# Patient Record
Sex: FEMALE | Race: BLACK OR AFRICAN AMERICAN | NOT HISPANIC OR LATINO | Employment: FULL TIME | URBAN - METROPOLITAN AREA
[De-identification: names, ages, dates, MRNs, and addresses within clinical notes are randomized per-mention and may not be internally consistent; named-entity substitution may affect disease eponyms.]

---

## 2024-09-30 ENCOUNTER — OFFICE VISIT (OUTPATIENT)
Dept: FAMILY MEDICINE | Facility: CLINIC | Age: 29
End: 2024-09-30
Payer: COMMERCIAL

## 2024-09-30 VITALS
SYSTOLIC BLOOD PRESSURE: 129 MMHG | HEART RATE: 101 BPM | DIASTOLIC BLOOD PRESSURE: 81 MMHG | OXYGEN SATURATION: 98 % | TEMPERATURE: 98.5 F | RESPIRATION RATE: 18 BRPM

## 2024-09-30 DIAGNOSIS — R21 RASH: ICD-10-CM

## 2024-09-30 DIAGNOSIS — L50.9 URTICARIA: Primary | ICD-10-CM

## 2024-09-30 PROCEDURE — 99203 OFFICE O/P NEW LOW 30 MIN: CPT

## 2024-09-30 NOTE — PROGRESS NOTES
URGENT CARE    ASSESSMENT AND PLAN:      ICD-10-CM    1. Urticaria  L50.9       2. Rash  R21           Rash sounds to be consistent with urticaria and etiology of this was discussed with patient.  Unclear regarding exposure or trigger.  Will have her trial dose of Zyrtec twice daily for the next 5 to 7 days.  Use of calamine lotion and over-the-counter steroid cream discussed with patient.      Follow up with primary care provider with any problems, questions or concerns or if symptoms worsen or fail to improve. Patient verbalized understanding and is agreeable to plan. The patient was discharged ambulatory and in stable condition.        Chief Complaint   Patient presents with    Urgent Care     Pt states she has had hives that started on her neck and has now traveled to her arms for the past 3 days intermittently. Pt states she has taken OTC benadryl with relief.      SUBJECTIVE:  Homa Law is a 29 year old female who presents to the clinic today for a rash.  Onset of rash was 3 day(s) ago.   Rash is sudden onset.   Location of the rash: neck, bilateral arms, and chest  Quality/symptoms of rash: red and raised   Symptoms are moderate and rash seems to be not changing over the course of time.  Previous history of a similar rash? No  Treatment measures tried include:  Benadryl   Recent exposure history: none known    Patient denies fever/chills, difficulty breathing, throat/tongue swelling, new meds, pets, foods, soaps, detergents, lotions, or enviornmental contacts.    History reviewed. No pertinent past medical history.  Current Outpatient Medications   Medication Sig Dispense Refill    metFORMIN (GLUCOPHAGE) 500 MG tablet Take 1,000 mg by mouth 2 times daily (with meals).       No current facility-administered medications for this visit.     Social History     Tobacco Use    Smoking status: Never    Smokeless tobacco: Never   Substance Use Topics    Alcohol use: Not on file     No Known  Allergies    Review of Systems  All systems reviewed and negative except per HPI.    EXAM:   /81   Pulse 101   Temp 98.5  F (36.9  C) (Tympanic)   Resp 18   SpO2 98%     Physical Exam  GENERAL: alert and no distress  EYES: Eyes grossly normal to inspection, PERRL and conjunctivae and sclerae normal  HENT: ear canals and TM's normal, nose and mouth without ulcers or lesions  NECK: no adenopathy, no asymmetry, masses, or scars  RESP: lungs clear to auscultation - no rales, rhonchi or wheezes  CV: regular rate and rhythm, normal S1 S2, no S3 or S4, no murmur, click or rub, no peripheral edema  SKIN: no suspicious lesions or rashes

## 2024-10-29 DIAGNOSIS — N97.0 FEMALE INFERTILITY ASSOCIATED WITH ANOVULATION: Primary | ICD-10-CM

## 2024-10-31 ENCOUNTER — LAB (OUTPATIENT)
Dept: LAB | Facility: CLINIC | Age: 29
End: 2024-10-31
Payer: COMMERCIAL

## 2024-10-31 DIAGNOSIS — N97.0 FEMALE INFERTILITY ASSOCIATED WITH ANOVULATION: ICD-10-CM

## 2024-10-31 PROCEDURE — 36415 COLL VENOUS BLD VENIPUNCTURE: CPT

## 2024-10-31 PROCEDURE — 84144 ASSAY OF PROGESTERONE: CPT

## 2024-11-01 LAB — PROGEST SERPL-MCNC: 21.7 NG/ML

## 2024-11-14 NOTE — PATIENT INSTRUCTIONS
If you have labs or imaging done, the results will automatically release in [facility] without an interpretation.  Your health care professional will review those results and send an interpretation with recommendations as soon as possible, but this may be 1-3 business days.    If you have any questions regarding your visit, please contact your care team.     [employer] [facility]: [phone]  [facility] CLINIC HOURS TELEPHONE NUMBER   Ita Macias, WILFRID Javier-SOLEDAD Balderrama-SOLEDAD Goff-Surgery Scheduler  Caitlin-       Monday- [place]  8:00 am-4:00 pm    Tuesday- [place]  8:00 am-4:00 pm    Wednesday- [place] 8:00 am-4:00 pm    Thursday- [place] 8:00 am-4:00 pm    Friday- [place]  8:00 am-4:00 pm [facility]  [address]  [place] [address]  PH: [phone]  Fax: [phone]    Imaging Scheduling all locations  PH: [phone]     [facility]  [address] [place] [address] [address].  [place], [place] [address] [phone]    [facility]  [address] [place] [address]  PH: [phone]     **Surgeries** Our Surgery Schedulers will contact you to schedule. If you do not receive a call within 3 business days, please call [phone].  Urgent Care locations:  [facility]       Monday-Friday   10 am - 8 pm    Saturday and Sunday   9 am - 5 pm   [phone]   If you need a medication refill, please contact your pharmacy. Please allow 3 business days for your refill to be completed.  As always, Thank you for trusting us with your healthcare needs!

## 2024-11-15 ENCOUNTER — OFFICE VISIT (OUTPATIENT)
Dept: OBGYN | Facility: CLINIC | Age: 29
End: 2024-11-15
Payer: COMMERCIAL

## 2024-11-15 VITALS
WEIGHT: 211 LBS | DIASTOLIC BLOOD PRESSURE: 77 MMHG | SYSTOLIC BLOOD PRESSURE: 116 MMHG | HEART RATE: 92 BPM | OXYGEN SATURATION: 99 %

## 2024-11-15 DIAGNOSIS — E28.2 PCOS (POLYCYSTIC OVARIAN SYNDROME): Primary | ICD-10-CM

## 2024-11-15 LAB
ALBUMIN SERPL BCG-MCNC: 4.3 G/DL (ref 3.5–5.2)
ALP SERPL-CCNC: 53 U/L (ref 40–150)
ALT SERPL W P-5'-P-CCNC: 37 U/L (ref 0–50)
ANION GAP SERPL CALCULATED.3IONS-SCNC: 11 MMOL/L (ref 7–15)
AST SERPL W P-5'-P-CCNC: 33 U/L (ref 0–45)
BILIRUB SERPL-MCNC: 0.6 MG/DL
BUN SERPL-MCNC: 9.7 MG/DL (ref 6–20)
CALCIUM SERPL-MCNC: 9.4 MG/DL (ref 8.8–10.4)
CHLORIDE SERPL-SCNC: 107 MMOL/L (ref 98–107)
CREAT SERPL-MCNC: 0.87 MG/DL (ref 0.51–0.95)
EGFRCR SERPLBLD CKD-EPI 2021: >90 ML/MIN/1.73M2
GLUCOSE SERPL-MCNC: 98 MG/DL (ref 70–99)
HCO3 SERPL-SCNC: 22 MMOL/L (ref 22–29)
POTASSIUM SERPL-SCNC: 4.1 MMOL/L (ref 3.4–5.3)
PROT SERPL-MCNC: 7.2 G/DL (ref 6.4–8.3)
SODIUM SERPL-SCNC: 140 MMOL/L (ref 135–145)

## 2024-11-15 PROCEDURE — 36415 COLL VENOUS BLD VENIPUNCTURE: CPT

## 2024-11-15 PROCEDURE — 80053 COMPREHEN METABOLIC PANEL: CPT

## 2024-11-15 RX ORDER — LETROZOLE 2.5 MG/1
7.5 TABLET, FILM COATED ORAL
COMMUNITY
Start: 2024-10-12

## 2024-12-15 ENCOUNTER — HEALTH MAINTENANCE LETTER (OUTPATIENT)
Age: 29
End: 2024-12-15

## 2025-01-06 SDOH — HEALTH STABILITY: PHYSICAL HEALTH: ON AVERAGE, HOW MANY DAYS PER WEEK DO YOU ENGAGE IN MODERATE TO STRENUOUS EXERCISE (LIKE A BRISK WALK)?: 0 DAYS

## 2025-01-06 SDOH — HEALTH STABILITY: PHYSICAL HEALTH: ON AVERAGE, HOW MANY MINUTES DO YOU ENGAGE IN EXERCISE AT THIS LEVEL?: 0 MIN

## 2025-01-06 ASSESSMENT — SOCIAL DETERMINANTS OF HEALTH (SDOH): HOW OFTEN DO YOU GET TOGETHER WITH FRIENDS OR RELATIVES?: ONCE A WEEK

## 2025-01-08 ENCOUNTER — OFFICE VISIT (OUTPATIENT)
Dept: FAMILY MEDICINE | Facility: CLINIC | Age: 30
End: 2025-01-08
Payer: COMMERCIAL

## 2025-01-08 ENCOUNTER — MYC MEDICAL ADVICE (OUTPATIENT)
Dept: FAMILY MEDICINE | Facility: CLINIC | Age: 30
End: 2025-01-08

## 2025-01-08 VITALS
WEIGHT: 213.8 LBS | BODY MASS INDEX: 30.61 KG/M2 | SYSTOLIC BLOOD PRESSURE: 115 MMHG | TEMPERATURE: 98 F | RESPIRATION RATE: 12 BRPM | DIASTOLIC BLOOD PRESSURE: 74 MMHG | HEIGHT: 70 IN | HEART RATE: 81 BPM | OXYGEN SATURATION: 98 %

## 2025-01-08 DIAGNOSIS — Z00.00 ROUTINE GENERAL MEDICAL EXAMINATION AT A HEALTH CARE FACILITY: Primary | ICD-10-CM

## 2025-01-08 DIAGNOSIS — Z13.1 SCREENING FOR DIABETES MELLITUS: ICD-10-CM

## 2025-01-08 DIAGNOSIS — Z13.220 SCREENING FOR HYPERLIPIDEMIA: ICD-10-CM

## 2025-01-08 LAB
CHOLEST SERPL-MCNC: 174 MG/DL
EST. AVERAGE GLUCOSE BLD GHB EST-MCNC: 128 MG/DL
FASTING STATUS PATIENT QL REPORTED: YES
HBA1C MFR BLD: 6.1 % (ref 0–5.6)
HDLC SERPL-MCNC: 40 MG/DL
LDLC SERPL CALC-MCNC: 117 MG/DL
NONHDLC SERPL-MCNC: 134 MG/DL
TRIGL SERPL-MCNC: 84 MG/DL

## 2025-01-08 PROCEDURE — 83036 HEMOGLOBIN GLYCOSYLATED A1C: CPT | Performed by: INTERNAL MEDICINE

## 2025-01-08 PROCEDURE — 90656 IIV3 VACC NO PRSV 0.5 ML IM: CPT | Performed by: INTERNAL MEDICINE

## 2025-01-08 PROCEDURE — 90471 IMMUNIZATION ADMIN: CPT | Performed by: INTERNAL MEDICINE

## 2025-01-08 PROCEDURE — 90480 ADMN SARSCOV2 VAC 1/ONLY CMP: CPT | Performed by: INTERNAL MEDICINE

## 2025-01-08 PROCEDURE — 99385 PREV VISIT NEW AGE 18-39: CPT | Mod: 25 | Performed by: INTERNAL MEDICINE

## 2025-01-08 PROCEDURE — 80061 LIPID PANEL: CPT | Performed by: INTERNAL MEDICINE

## 2025-01-08 PROCEDURE — 91320 SARSCV2 VAC 30MCG TRS-SUC IM: CPT | Performed by: INTERNAL MEDICINE

## 2025-01-08 PROCEDURE — 36415 COLL VENOUS BLD VENIPUNCTURE: CPT | Performed by: INTERNAL MEDICINE

## 2025-01-08 ASSESSMENT — PAIN SCALES - GENERAL: PAINLEVEL_OUTOF10: NO PAIN (0)

## 2025-01-08 NOTE — PROGRESS NOTES
Prior to immunization administration, verified patients identity using patient s name and date of birth. Please see Immunization Activity for additional information.     Screening Questionnaire for Adult Immunization    Are you sick today?   No   Do you have allergies to medications, food, a vaccine component or latex?   No   Have you ever had a serious reaction after receiving a vaccination?   No   Do you have a long-term health problem with heart, lung, kidney, or metabolic disease (e.g., diabetes), asthma, a blood disorder, no spleen, complement component deficiency, a cochlear implant, or a spinal fluid leak?  Are you on long-term aspirin therapy?   No   Do you have cancer, leukemia, HIV/AIDS, or any other immune system problem?   No   Do you have a parent, brother, or sister with an immune system problem?   No   In the past 3 months, have you taken medications that affect  your immune system, such as prednisone, other steroids, or anticancer drugs; drugs for the treatment of rheumatoid arthritis, Crohn s disease, or psoriasis; or have you had radiation treatments?   No   Have you had a seizure, or a brain or other nervous system problem?   No   During the past year, have you received a transfusion of blood or blood    products, or been given immune (gamma) globulin or antiviral drug?   No   For women: Are you pregnant or is there a chance you could become       pregnant during the next month?   No   Have you received any vaccinations in the past 4 weeks?   No     Immunization questionnaire answers were all negative.      Patient instructed to remain in clinic for 15 minutes afterwards, and to report any adverse reactions.     Screening performed by Naren Bella MA on 1/8/2025 at 1:58 PM.

## 2025-01-08 NOTE — PATIENT INSTRUCTIONS
Patient Education   Preventive Care Advice   This is general advice given by our system to help you stay healthy. However, your care team may have specific advice just for you. Please talk to your care team about your preventive care needs.  Nutrition  Eat 5 or more servings of fruits and vegetables each day.  Try wheat bread, brown rice and whole grain pasta (instead of white bread, rice, and pasta).  Get enough calcium and vitamin D. Check the label on foods and aim for 100% of the RDA (recommended daily allowance).  Lifestyle  Exercise at least 150 minutes each week  (30 minutes a day, 5 days a week).  Do muscle strengthening activities 2 days a week. These help control your weight and prevent disease.  No smoking.  Wear sunscreen to prevent skin cancer.  Have a dental exam and cleaning every 6 months.  Yearly exams  See your health care team every year to talk about:  Any changes in your health.  Any medicines your care team has prescribed.  Preventive care, family planning, and ways to prevent chronic diseases.  Shots (vaccines)   HPV shots (up to age 26), if you've never had them before.  Hepatitis B shots (up to age 59), if you've never had them before.  COVID-19 shot: Get this shot when it's due.  Flu shot: Get a flu shot every year.  Tetanus shot: Get a tetanus shot every 10 years.  Pneumococcal, hepatitis A, and RSV shots: Ask your care team if you need these based on your risk.  Shingles shot (for age 50 and up)  General health tests  Diabetes screening:  Starting at age 35, Get screened for diabetes at least every 3 years.  If you are younger than age 35, ask your care team if you should be screened for diabetes.  Cholesterol test: At age 39, start having a cholesterol test every 5 years, or more often if advised.  Bone density scan (DEXA): At age 50, ask your care team if you should have this scan for osteoporosis (brittle bones).  Hepatitis C: Get tested at least once in your life.  STIs (sexually  transmitted infections)  Before age 24: Ask your care team if you should be screened for STIs.  After age 24: Get screened for STIs if you're at risk. You are at risk for STIs (including HIV) if:  You are sexually active with more than one person.  You don't use condoms every time.  You or a partner was diagnosed with a sexually transmitted infection.  If you are at risk for HIV, ask about PrEP medicine to prevent HIV.  Get tested for HIV at least once in your life, whether you are at risk for HIV or not.  Cancer screening tests  Cervical cancer screening: If you have a cervix, begin getting regular cervical cancer screening tests starting at age 21.  Breast cancer scan (mammogram): If you've ever had breasts, begin having regular mammograms starting at age 40. This is a scan to check for breast cancer.  Colon cancer screening: It is important to start screening for colon cancer at age 45.  Have a colonoscopy test every 10 years (or more often if you're at risk) Or, ask your provider about stool tests like a FIT test every year or Cologuard test every 3 years.  To learn more about your testing options, visit:   .  For help making a decision, visit:   https://bit.ly/xq62242.  Prostate cancer screening test: If you have a prostate, ask your care team if a prostate cancer screening test (PSA) at age 55 is right for you.  Lung cancer screening: If you are a current or former smoker ages 50 to 80, ask your care team if ongoing lung cancer screenings are right for you.  For informational purposes only. Not to replace the advice of your health care provider. Copyright   2023 [facility] [employer]. All rights reserved. Clinically reviewed by the [facility] Transitions Program. [employer] [id number] - REV 01/24.     Patient Education   Preventive Care Advice   This is general advice given by our system to help you stay healthy. However, your care team may have specific advice just for you. Please talk to your care team  about your preventive care needs.  Nutrition  Eat 5 or more servings of fruits and vegetables each day.  Try wheat bread, brown rice and whole grain pasta (instead of white bread, rice, and pasta).  Get enough calcium and vitamin D. Check the label on foods and aim for 100% of the RDA (recommended daily allowance).  Lifestyle  Exercise at least 150 minutes each week  (30 minutes a day, 5 days a week).  Do muscle strengthening activities 2 days a week. These help control your weight and prevent disease.  No smoking.  Wear sunscreen to prevent skin cancer.  Have a dental exam and cleaning every 6 months.  Yearly exams  See your health care team every year to talk about:  Any changes in your health.  Any medicines your care team has prescribed.  Preventive care, family planning, and ways to prevent chronic diseases.  Shots (vaccines)   HPV shots (up to age 26), if you've never had them before.  Hepatitis B shots (up to age 59), if you've never had them before.  COVID-19 shot: Get this shot when it's due.  Flu shot: Get a flu shot every year.  Tetanus shot: Get a tetanus shot every 10 years.  Pneumococcal, hepatitis A, and RSV shots: Ask your care team if you need these based on your risk.  Shingles shot (for age 50 and up)  General health tests  Diabetes screening:  Starting at age 35, Get screened for diabetes at least every 3 years.  If you are younger than age 35, ask your care team if you should be screened for diabetes.  Cholesterol test: At age 39, start having a cholesterol test every 5 years, or more often if advised.  Bone density scan (DEXA): At age 50, ask your care team if you should have this scan for osteoporosis (brittle bones).  Hepatitis C: Get tested at least once in your life.  STIs (sexually transmitted infections)  Before age 24: Ask your care team if you should be screened for STIs.  After age 24: Get screened for STIs if you're at risk. You are at risk for STIs (including HIV) if:  You are  sexually active with more than one person.  You don't use condoms every time.  You or a partner was diagnosed with a sexually transmitted infection.  If you are at risk for HIV, ask about PrEP medicine to prevent HIV.  Get tested for HIV at least once in your life, whether you are at risk for HIV or not.  Cancer screening tests  Cervical cancer screening: If you have a cervix, begin getting regular cervical cancer screening tests starting at age 21.  Breast cancer scan (mammogram): If you've ever had breasts, begin having regular mammograms starting at age 40. This is a scan to check for breast cancer.  Colon cancer screening: It is important to start screening for colon cancer at age 45.  Have a colonoscopy test every 10 years (or more often if you're at risk) Or, ask your provider about stool tests like a FIT test every year or Cologuard test every 3 years.  To learn more about your testing options, visit:   .  For help making a decision, visit:   https://bit.ly/rk18428.  Prostate cancer screening test: If you have a prostate, ask your care team if a prostate cancer screening test (PSA) at age 55 is right for you.  Lung cancer screening: If you are a current or former smoker ages 50 to 80, ask your care team if ongoing lung cancer screenings are right for you.  For informational purposes only. Not to replace the advice of your health care provider. Copyright   2023 [employer]. All rights reserved. Clinically reviewed by the [facility] Transitions Program. [employer] [id number] - REV 01/24.

## 2025-01-08 NOTE — PROGRESS NOTES
"Preventive Care Visit  [facility]  Eric Daley MD, Internal Medicine  Jan 8, 2025      Assessment & Plan     Routine general medical examination at a health care facility  Discussed about diet and exercise  She has grade 1 obesity  We discussed about Wegovy today  She is keen to start this  She follows up with fertility specialist and [facility]  She is already on metformin for her PCOS along with Femara and Ovidrel injections  She is up-to-date with Pap smear  No family history of any colon cancer or ovarian cancer    Screening for diabetes mellitus  She has history of prediabetes in the past recheck A1c today  - Hemoglobin A1c; Future  - Hemoglobin A1c    Screening for hyperlipidemia    - Lipid panel reflex to direct LDL Fasting; Future  - Lipid panel reflex to direct LDL Fasting    Patient has been advised of split billing requirements and indicates understanding: No        BMI  Estimated body mass index is 31.12 kg/m  as calculated from the following:    Height as of this encounter: 1.765 m (5' 9.5\").    Weight as of this encounter: 97 kg (213 lb 12.8 oz).   Weight management plan: Discussed healthy diet and exercise guidelines    Counseling  Appropriate preventive services were addressed with this patient via screening, questionnaire, or discussion as appropriate for fall prevention, nutrition, physical activity, Tobacco-use cessation, social engagement, weight loss and cognition.  Checklist reviewing preventive services available has been given to the patient.  Reviewed patient's diet, addressing concerns and/or questions.           Gus Luther is a 29 year old, presenting for the following:  Physical        1/8/2025     1:15 PM   Additional Questions   Roomed by Paula   Accompanied by self          HPI  For annual physical        Health Care Directive  Patient does not have a Health Care Directive: Discussed advance care planning with patient; however, patient declined at " this time.      1/6/2025   General Health   How would you rate your overall physical health? Good   Feel stress (tense, anxious, or unable to sleep) Only a little   (!) STRESS CONCERN      1/6/2025   Nutrition   Three or more servings of calcium each day? (!) NO   Diet: Regular (no restrictions)   How many servings of fruit and vegetables per day? (!) 0-1   How many sweetened beverages each day? 0-1         1/6/2025   Exercise   Days per week of moderate/strenous exercise 0 days   Average minutes spent exercising at this level 0 min   (!) EXERCISE CONCERN      1/6/2025   Social Factors   Frequency of gathering with friends or relatives Once a week   Worry food won't last until get money to buy more No   Food not last or not have enough money for food? No   Do you have housing? (Housing is defined as stable permanent housing and does not include staying ouside in a car, in a tent, in an abandoned building, in an overnight shelter, or couch-surfing.) No   Are you worried about losing your housing? No   Lack of transportation? No   Unable to get utilities (heat,electricity)? No   Want help with housing or utility concern? No   (!) HOUSING CONCERN PRESENT      1/6/2025   Dental   Dentist two times every year? Yes         8/31/2024   TB Screening   Were you born outside of the [place]? No         Today's PHQ-2 Score:       1/7/2025     3:02 PM   PHQ-2 ( 1999 [employer])   Q1: Little interest or pleasure in doing things 0   Q2: Feeling down, depressed or hopeless 0   PHQ-2 Score 0    Q1: Little interest or pleasure in doing things Not at all   Q2: Feeling down, depressed or hopeless Not at all   PHQ-2 Score 0       Patient-reported           1/6/2025   Substance Use   Alcohol more than 3/day or more than 7/wk No   Do you use any other substances recreationally? No     Social History     Tobacco Use    Smoking status: Never    Smokeless tobacco: Never   Vaping Use    Vaping status: Never Used   Substance Use Topics    Alcohol use:  Yes     Comment: Socially    Drug use: Never          Mammogram Screening - Patient under 40 years of age: Routine Mammogram Screening not recommended.           1/6/2025   One time HIV Screening   Previous HIV test? No         1/6/2025   STI Screening   New sexual partner(s) since last STI/HIV test? No     History of abnormal Pap smear: No - age 21-29 PAP every 3 years recommended             1/6/2025   Contraception/Family Planning   Questions about contraception or family planning No        Reviewed and updated as needed this visit by Provider                          Review of Systems  Constitutional, neuro, ENT, endocrine, pulmonary, cardiac, gastrointestinal, genitourinary, musculoskeletal, integument and psychiatric systems are negative, except as otherwise noted.     Objective    Exam  LMP 11/13/2024 (Exact Date)    There is no height or weight on file to calculate BMI.    Physical Exam  GENERAL: alert and no distress  EYES: Eyes grossly normal to inspection, PERRL and conjunctivae and sclerae normal  HENT: ear canals and TM's normal, nose and mouth without ulcers or lesions  NECK: no adenopathy, no asymmetry, masses, or scars  RESP: lungs clear to auscultation - no rales, rhonchi or wheezes  CV: regular rate and rhythm, normal S1 S2, no S3 or S4, no murmur, click or rub, no peripheral edema  MS: no gross musculoskeletal defects noted, no edema  SKIN: no suspicious lesions or rashes  NEURO: Normal strength and tone, mentation intact and speech normal  PSYCH: mentation appears normal, affect normal/bright        Signed Electronically by: Eric Daley MD

## 2025-01-28 ENCOUNTER — TRANSFERRED RECORDS (OUTPATIENT)
Dept: HEALTH INFORMATION MANAGEMENT | Facility: CLINIC | Age: 30
End: 2025-01-28
Payer: COMMERCIAL

## 2025-01-30 ENCOUNTER — TELEPHONE (OUTPATIENT)
Dept: DERMATOLOGY | Facility: CLINIC | Age: 30
End: 2025-01-30
Payer: COMMERCIAL

## 2025-01-30 NOTE — TELEPHONE ENCOUNTER
Online Appointment Request      Health Call Center  Phone Message      Reason for Call: Appointment Intake     Patients Requests:    Reason for appointment  I have a small hard bump under my right breast I want checked out. And skin tag removal.  Preferred Visit Type  In-person        Action taken: Other: none; Documenting patient was called; detail message was left for patient to call back.

## 2025-02-03 ENCOUNTER — MYC MEDICAL ADVICE (OUTPATIENT)
Dept: FAMILY MEDICINE | Facility: CLINIC | Age: 30
End: 2025-02-03

## 2025-02-03 DIAGNOSIS — E88.82 CLASS 1 OBESITY DUE TO DISRUPTION OF MC4R PATHWAY WITH SERIOUS COMORBIDITY AND BODY MASS INDEX (BMI) OF 31.0 TO 31.9 IN ADULT: Primary | ICD-10-CM

## 2025-02-03 DIAGNOSIS — E66.811 CLASS 1 OBESITY DUE TO DISRUPTION OF MC4R PATHWAY WITH SERIOUS COMORBIDITY AND BODY MASS INDEX (BMI) OF 31.0 TO 31.9 IN ADULT: Primary | ICD-10-CM

## 2025-02-03 DIAGNOSIS — Z15.2 CLASS 1 OBESITY DUE TO DISRUPTION OF MC4R PATHWAY WITH SERIOUS COMORBIDITY AND BODY MASS INDEX (BMI) OF 31.0 TO 31.9 IN ADULT: Primary | ICD-10-CM

## 2025-03-03 ENCOUNTER — MYC MEDICAL ADVICE (OUTPATIENT)
Dept: FAMILY MEDICINE | Facility: CLINIC | Age: 30
End: 2025-03-03
Payer: COMMERCIAL

## 2025-03-03 DIAGNOSIS — Z15.2 CLASS 1 OBESITY DUE TO DISRUPTION OF MC4R PATHWAY WITH SERIOUS COMORBIDITY AND BODY MASS INDEX (BMI) OF 31.0 TO 31.9 IN ADULT: Primary | ICD-10-CM

## 2025-03-03 DIAGNOSIS — E66.811 CLASS 1 OBESITY DUE TO DISRUPTION OF MC4R PATHWAY WITH SERIOUS COMORBIDITY AND BODY MASS INDEX (BMI) OF 31.0 TO 31.9 IN ADULT: Primary | ICD-10-CM

## 2025-03-03 DIAGNOSIS — E88.82 CLASS 1 OBESITY DUE TO DISRUPTION OF MC4R PATHWAY WITH SERIOUS COMORBIDITY AND BODY MASS INDEX (BMI) OF 31.0 TO 31.9 IN ADULT: Primary | ICD-10-CM

## 2025-03-18 ENCOUNTER — TRANSFERRED RECORDS (OUTPATIENT)
Dept: HEALTH INFORMATION MANAGEMENT | Facility: CLINIC | Age: 30
End: 2025-03-18
Payer: COMMERCIAL

## 2025-04-01 ENCOUNTER — MYC MEDICAL ADVICE (OUTPATIENT)
Dept: FAMILY MEDICINE | Facility: CLINIC | Age: 30
End: 2025-04-01
Payer: COMMERCIAL

## 2025-04-01 DIAGNOSIS — Z15.2 CLASS 1 OBESITY DUE TO DISRUPTION OF MC4R PATHWAY WITH SERIOUS COMORBIDITY AND BODY MASS INDEX (BMI) OF 31.0 TO 31.9 IN ADULT: ICD-10-CM

## 2025-04-01 DIAGNOSIS — E88.82 CLASS 1 OBESITY DUE TO DISRUPTION OF MC4R PATHWAY WITH SERIOUS COMORBIDITY AND BODY MASS INDEX (BMI) OF 31.0 TO 31.9 IN ADULT: ICD-10-CM

## 2025-04-01 DIAGNOSIS — E66.811 CLASS 1 OBESITY DUE TO DISRUPTION OF MC4R PATHWAY WITH SERIOUS COMORBIDITY AND BODY MASS INDEX (BMI) OF 31.0 TO 31.9 IN ADULT: ICD-10-CM

## 2025-04-01 NOTE — TELEPHONE ENCOUNTER
Routing to provider to review and advise on increasing dose of Wegovy. Med and pharmacy pended.    Nando Neff RN  [facility]

## 2025-06-18 ENCOUNTER — MYC MEDICAL ADVICE (OUTPATIENT)
Dept: OBGYN | Facility: CLINIC | Age: 30
End: 2025-06-18
Payer: COMMERCIAL

## 2025-06-18 DIAGNOSIS — N63.13 MASS OF LOWER OUTER QUADRANT OF RIGHT BREAST: Primary | ICD-10-CM

## 2025-06-19 ENCOUNTER — PATIENT OUTREACH (OUTPATIENT)
Dept: ONCOLOGY | Facility: CLINIC | Age: 30
End: 2025-06-19
Payer: COMMERCIAL

## 2025-06-19 NOTE — PROGRESS NOTES
New Patient Oncology Nurse Navigator Note     Referring provider: Ita Macias APRN CNP      Referring Clinic/Organization: [facility]      Referred to (specialty:) Breast Provider Referral     Requested provider (if applicable): NA     Date Referral Received: June 19, 2025     Evaluation for:  N63.13 (ICD-10-CM) - Mass of lower outer quadrant of right breast   Pt would like removal of breast lump      Clinical History (per Nurse review of records provided):      Patient seen on 2/21/25 by Ita YEPEZ CNP was noted to have a mass of the lower quadrant of the right breast. Ultrasound was ordered and completed on 3/6/25 which showed: Targeted right breast ultrasound was performed demonstrating an intradermal cystic lesion at the 5:00 position, 10 cm from the nipple accounting for the lump. Findings are compatible with a sebaceous cyst or epidermal inclusion cyst.    Records Location: Care Everywhere, Media, and See Bookmarked material     Records Needed: NA     Additional testing needed prior to consult: NA    Payor: BCBS / Plan: [place]BS [place] / Product Type: Indemnity /     June 19, 2025  Referral received and reviewed.   Sent message to Dr. Rhodes for review and recommendations.     Tawnya SANTOSN, RN, OCN  Oncology Nurse Navigator   [facility]  Cancer Care Service Line   New Patient Hem/Onc Scheduling / Referrals: [phone] (fax: [phone] )

## 2025-06-19 NOTE — TELEPHONE ENCOUNTER
"Pt is requesting a referral to someone that can remove a cyst from underneath her breast.    Pt was last seen by Ita Macias, LUCHO CNP, on 2/21/25 for a mass of \"lower outer quadrant of right breast\".  Breast imaging was ordered.    Per findings on the 3/6/25 right breast US:    \"Targeted right breast ultrasound was performed  demonstrating an intradermal cystic lesion at the 5:00 position, 10 cm from the nipple accounting for the lump. Findings are compatible with a sebaceous cyst or epidermal inclusion cyst.\"    Ita had mentioned to pt in the result note that if she was ever interested in having this cyst removed she would point her in the right direction.    Pt is requesting a referral to someone to discuss removal of this cyst.  She states it is starting to cause discomfort and \"feels it more\".    Routing to Ita to place a referral as appropriate.    Conchis Borges RN-[facility]    "

## 2025-06-19 NOTE — TELEPHONE ENCOUNTER
I placed an order through our breast  system- they will call her to appropriately schedule next steps.    LUCHO Fernandez CNP

## 2025-06-24 ENCOUNTER — APPOINTMENT (OUTPATIENT)
Dept: URBAN - METROPOLITAN AREA CLINIC 253 | Age: 30
Setting detail: DERMATOLOGY
End: 2025-06-26

## 2025-06-24 VITALS — RESPIRATION RATE: 14 BRPM | WEIGHT: 204 LBS | HEIGHT: 70 IN

## 2025-06-24 DIAGNOSIS — L72.0 EPIDERMAL CYST: ICD-10-CM

## 2025-06-24 PROCEDURE — OTHER INTRALESIONAL KENALOG: OTHER

## 2025-06-24 PROCEDURE — OTHER MIPS QUALITY: OTHER

## 2025-06-24 PROCEDURE — OTHER COUNSELING: OTHER

## 2025-06-24 PROCEDURE — OTHER SEPARATE AND IDENTIFIABLE DOCUMENTATION: OTHER

## 2025-06-24 ASSESSMENT — LOCATION ZONE DERM: LOCATION ZONE: TRUNK

## 2025-06-24 ASSESSMENT — LOCATION DETAILED DESCRIPTION DERM: LOCATION DETAILED: RIGHT INFRAMAMMARY CREASE (INNER QUADRANT)

## 2025-06-24 ASSESSMENT — LOCATION SIMPLE DESCRIPTION DERM: LOCATION SIMPLE: RIGHT BREAST

## 2025-06-26 ENCOUNTER — TRANSCRIBE ORDERS (OUTPATIENT)
Dept: OTHER | Age: 30
End: 2025-06-26

## 2025-06-26 DIAGNOSIS — N63.13 MASS OF LOWER OUTER QUADRANT OF RIGHT BREAST: Primary | ICD-10-CM

## 2025-07-03 ENCOUNTER — APPOINTMENT (OUTPATIENT)
Dept: URBAN - METROPOLITAN AREA CLINIC 255 | Age: 30
Setting detail: DERMATOLOGY
End: 2025-07-03

## 2025-07-03 VITALS — HEIGHT: 72 IN | WEIGHT: 204 LBS

## 2025-07-03 DIAGNOSIS — L72.0 EPIDERMAL CYST: ICD-10-CM

## 2025-07-03 PROCEDURE — OTHER ADDITIONAL NOTES: OTHER

## 2025-07-03 PROCEDURE — OTHER COUNSELING: OTHER

## 2025-07-03 PROCEDURE — OTHER MIPS QUALITY: OTHER

## 2025-07-03 PROCEDURE — OTHER INCISION AND DRAINAGE: OTHER

## 2025-07-03 PROCEDURE — OTHER PHOTO-DOCUMENTATION: OTHER

## 2025-07-03 ASSESSMENT — LOCATION ZONE DERM: LOCATION ZONE: TRUNK

## 2025-07-03 ASSESSMENT — LOCATION SIMPLE DESCRIPTION DERM: LOCATION SIMPLE: RIGHT BREAST

## 2025-07-03 ASSESSMENT — LOCATION DETAILED DESCRIPTION DERM
LOCATION DETAILED: RIGHT MEDIAL BREAST 5-6:00 REGION
LOCATION DETAILED: RIGHT INFRAMAMMARY CREASE (INNER QUADRANT)

## 2025-07-10 ENCOUNTER — APPOINTMENT (OUTPATIENT)
Dept: URBAN - METROPOLITAN AREA CLINIC 255 | Age: 30
Setting detail: DERMATOLOGY
End: 2025-07-10

## 2025-07-10 DIAGNOSIS — Z48.817 ENCOUNTER FOR SURGICAL AFTERCARE FOLLOWING SURGERY ON THE SKIN AND SUBCUTANEOUS TISSUE: ICD-10-CM

## 2025-07-10 PROCEDURE — OTHER PHOTO-DOCUMENTATION: OTHER

## 2025-07-10 PROCEDURE — OTHER PATIENT SPECIFIC COUNSELING: OTHER

## 2025-07-10 PROCEDURE — OTHER COUNSELING: OTHER

## 2025-07-10 PROCEDURE — 99212 OFFICE O/P EST SF 10 MIN: CPT | Mod: 24

## 2025-07-10 PROCEDURE — OTHER MIPS QUALITY: OTHER

## 2025-07-10 ASSESSMENT — LOCATION ZONE DERM: LOCATION ZONE: TRUNK

## 2025-07-10 ASSESSMENT — PAIN INTENSITY VAS: HOW INTENSE IS YOUR PAIN 0 BEING NO PAIN, 10 BEING THE MOST SEVERE PAIN POSSIBLE?: NO PAIN

## 2025-07-10 ASSESSMENT — LOCATION SIMPLE DESCRIPTION DERM: LOCATION SIMPLE: RIGHT BREAST

## 2025-07-10 ASSESSMENT — LOCATION DETAILED DESCRIPTION DERM: LOCATION DETAILED: RIGHT INFRAMAMMARY CREASE (INNER QUADRANT)

## 2025-08-06 ENCOUNTER — TRANSFERRED RECORDS (OUTPATIENT)
Dept: HEALTH INFORMATION MANAGEMENT | Facility: CLINIC | Age: 30
End: 2025-08-06
Payer: COMMERCIAL

## 2025-08-13 ENCOUNTER — VIRTUAL VISIT (OUTPATIENT)
Dept: OBGYN | Facility: CLINIC | Age: 30
End: 2025-08-13
Attending: ADVANCED PRACTICE MIDWIFE
Payer: COMMERCIAL

## 2025-08-13 DIAGNOSIS — O09.819 SUPERVISION OF PREGNANCY RESULTING FROM ASSISTED REPRODUCTIVE TECHNOLOGY: Primary | ICD-10-CM

## 2025-08-13 PROBLEM — E28.2 PCOS (POLYCYSTIC OVARIAN SYNDROME): Status: ACTIVE | Noted: 2025-08-13

## 2025-08-13 PROBLEM — M53.3 SACRAL PAIN: Status: RESOLVED | Noted: 2019-07-07 | Resolved: 2025-08-13

## 2025-08-13 PROBLEM — R94.6 ABNORMAL THYROID FUNCTION TEST: Status: ACTIVE | Noted: 2018-12-21

## 2025-08-13 PROBLEM — E28.8 HYPERANDROGENISM: Status: ACTIVE | Noted: 2023-07-29

## 2025-08-13 PROBLEM — N97.0 FEMALE INFERTILITY ASSOCIATED WITH ANOVULATION: Status: RESOLVED | Noted: 2025-01-24 | Resolved: 2025-08-13

## 2025-08-13 PROBLEM — R73.03 PREDIABETES: Status: ACTIVE | Noted: 2018-12-21

## 2025-08-13 PROBLEM — E88.819 INSULIN RESISTANCE: Status: ACTIVE | Noted: 2023-07-29

## 2025-08-13 PROBLEM — N92.6 MENSTRUAL IRREGULARITY: Status: RESOLVED | Noted: 2024-09-04 | Resolved: 2025-08-13

## 2025-08-13 PROBLEM — E66.3 OVERWEIGHT: Status: ACTIVE | Noted: 2019-05-30

## 2025-08-13 PROCEDURE — 99207 PR NO BILLABLE SERVICE THIS VISIT: CPT | Mod: 93

## 2025-08-13 RX ORDER — PROGESTERONE 200 MG/1
CAPSULE ORAL
COMMUNITY
Start: 2025-07-17

## 2025-08-13 RX ORDER — ESTRADIOL 2 MG/1
TABLET ORAL
COMMUNITY
Start: 2025-07-03

## 2025-08-13 RX ORDER — ASPIRIN 81 MG/1
TABLET, CHEWABLE ORAL
COMMUNITY
Start: 2025-07-03

## 2025-08-13 RX ORDER — PROGESTERONE 50 MG/ML
INJECTION, SOLUTION INTRAMUSCULAR
COMMUNITY
Start: 2025-07-18

## 2025-08-13 RX ORDER — PRENATAL VIT/IRON FUM/FOLIC AC 27MG-0.8MG
1 TABLET ORAL DAILY
COMMUNITY

## 2025-08-27 ASSESSMENT — EDINBURGH POSTNATAL DEPRESSION SCALE (EPDS)
I HAVE LOOKED FORWARD WITH ENJOYMENT TO THINGS: AS MUCH AS I EVER DID
I HAVE BEEN ABLE TO LAUGH AND SEE THE FUNNY SIDE OF THINGS: AS MUCH AS I ALWAYS COULD
I HAVE BEEN SO UNHAPPY THAT I HAVE HAD DIFFICULTY SLEEPING: NOT AT ALL
I HAVE FELT SAD OR MISERABLE: NO, NOT AT ALL
THINGS HAVE BEEN GETTING ON TOP OF ME: NO, I HAVE BEEN COPING AS WELL AS EVER
I HAVE FELT SCARED OR PANICKY FOR NO GOOD REASON: NO, NOT AT ALL
I HAVE BLAMED MYSELF UNNECESSARILY WHEN THINGS WENT WRONG: NOT VERY OFTEN
I HAVE BEEN SO UNHAPPY THAT I HAVE BEEN CRYING: NO, NEVER
THE THOUGHT OF HARMING MYSELF HAS OCCURRED TO ME: NEVER
TOTAL SCORE: 3
I HAVE BEEN ANXIOUS OR WORRIED FOR NO GOOD REASON: YES, SOMETIMES

## 2025-08-28 ENCOUNTER — APPOINTMENT (OUTPATIENT)
Dept: URBAN - METROPOLITAN AREA CLINIC 255 | Age: 30
Setting detail: DERMATOLOGY
End: 2025-08-29

## 2025-08-28 ENCOUNTER — LAB (OUTPATIENT)
Dept: LAB | Facility: CLINIC | Age: 30
End: 2025-08-28
Attending: ADVANCED PRACTICE MIDWIFE
Payer: COMMERCIAL

## 2025-08-28 ENCOUNTER — ANCILLARY PROCEDURE (OUTPATIENT)
Dept: ULTRASOUND IMAGING | Facility: CLINIC | Age: 30
End: 2025-08-28
Attending: ADVANCED PRACTICE MIDWIFE
Payer: COMMERCIAL

## 2025-08-28 ENCOUNTER — PRENATAL OFFICE VISIT (OUTPATIENT)
Dept: OBGYN | Facility: CLINIC | Age: 30
End: 2025-08-28
Attending: ADVANCED PRACTICE MIDWIFE
Payer: COMMERCIAL

## 2025-08-28 VITALS
WEIGHT: 212 LBS | SYSTOLIC BLOOD PRESSURE: 119 MMHG | BODY MASS INDEX: 31.4 KG/M2 | HEART RATE: 97 BPM | DIASTOLIC BLOOD PRESSURE: 79 MMHG | HEIGHT: 69 IN

## 2025-08-28 VITALS — HEIGHT: 70 IN | WEIGHT: 205 LBS

## 2025-08-28 DIAGNOSIS — Z36.89 ENCOUNTER FOR FETAL ANATOMIC SURVEY: ICD-10-CM

## 2025-08-28 DIAGNOSIS — O09.811 PREGNANCY RESULTING FROM IN VITRO FERTILIZATION IN FIRST TRIMESTER: ICD-10-CM

## 2025-08-28 DIAGNOSIS — O09.819 SUPERVISION OF PREGNANCY RESULTING FROM ASSISTED REPRODUCTIVE TECHNOLOGY: ICD-10-CM

## 2025-08-28 DIAGNOSIS — O09.91 HRP (HIGH RISK PREGNANCY), FIRST TRIMESTER: Primary | ICD-10-CM

## 2025-08-28 DIAGNOSIS — L72.8 OTHER FOLLICULAR CYSTS OF THE SKIN AND SUBCUTANEOUS TISSUE: ICD-10-CM

## 2025-08-28 PROBLEM — Z78.9 NONIMMUNE TO HEPATITIS B VIRUS: Status: ACTIVE | Noted: 2025-08-28

## 2025-08-28 PROCEDURE — 99212 OFFICE O/P EST SF 10 MIN: CPT

## 2025-08-28 PROCEDURE — OTHER PHOTO-DOCUMENTATION: OTHER

## 2025-08-28 PROCEDURE — 87491 CHLMYD TRACH DNA AMP PROBE: CPT | Performed by: ADVANCED PRACTICE MIDWIFE

## 2025-08-28 PROCEDURE — OTHER MIPS QUALITY: OTHER

## 2025-08-28 PROCEDURE — 76817 TRANSVAGINAL US OBSTETRIC: CPT

## 2025-08-28 PROCEDURE — 76817 TRANSVAGINAL US OBSTETRIC: CPT | Mod: 26 | Performed by: OBSTETRICS & GYNECOLOGY

## 2025-08-28 PROCEDURE — OTHER COUNSELING: OTHER

## 2025-08-28 PROCEDURE — OTHER PATIENT SPECIFIC COUNSELING: OTHER

## 2025-08-28 PROCEDURE — 99213 OFFICE O/P EST LOW 20 MIN: CPT | Performed by: ADVANCED PRACTICE MIDWIFE

## 2025-08-28 ASSESSMENT — LOCATION DETAILED DESCRIPTION DERM: LOCATION DETAILED: RIGHT MEDIAL BREAST 5-6:00 REGION

## 2025-08-28 ASSESSMENT — LOCATION SIMPLE DESCRIPTION DERM: LOCATION SIMPLE: RIGHT BREAST

## 2025-08-28 ASSESSMENT — LOCATION ZONE DERM: LOCATION ZONE: TRUNK

## 2025-08-30 LAB — BACTERIA UR CULT: NORMAL

## 2025-09-03 ENCOUNTER — MYC MEDICAL ADVICE (OUTPATIENT)
Dept: OBGYN | Facility: CLINIC | Age: 30
End: 2025-09-03
Payer: COMMERCIAL